# Patient Record
(demographics unavailable — no encounter records)

---

## 2025-01-17 NOTE — PHYSICAL EXAM
[General Appearance - Alert] : alert [General Appearance - In No Acute Distress] : in no acute distress [Extraocular Movements] : extraocular movements were intact [Musculoskeletal - Swelling] : no joint swelling seen [Motor Tone] : muscle strength and tone were normal

## 2025-01-17 NOTE — DATA REVIEWED
[FreeTextEntry1] : 1/12/25  , sodium 130, CO2 17, anion gap 21  Cr. 0.47, eGFR 113, AST 20, ALT 16  Negative Lyme IgG/IgM, RMSF IgM, babesia PCR   X-ray of left wrist (2/11/23 at )  IMPRESSION:  Normal left wrist radiographs   X-ray of left hand (2/11/23 at )  IMPRESSION:  Normal left hand radiographs

## 2025-01-17 NOTE — HISTORY OF PRESENT ILLNESS
[FreeTextEntry1] : The patient is a 54-year-old female with a PMH of HTN, HLD, GERD, gastritis, osteoarthritis, and osteopenia who presented to the office for an initial visit. The patient reported suddenly developing pain in her right hand about 1 week ago.  She subsequently developed pain in her right knee and possibly her right ankle.  She believes she may have had some swelling in the right hand.  She also experienced achiness in her upper arms and left buttock.  She denied having similar symptoms in the past.  She reported experiencing pain in her neck and chills several days prior to the onset of her symptoms.  She denied having any upper respiratory symptoms.  She was evaluated at an urgent care facility and underwent lab test which were notable for an elevated CRP level. She decided to discontinue taking hormone replacement therapy (estradiol and progesterone) the day after her symptoms started out of concerns about possible side effects.  Her symptoms have gradually improved since then and she currently denied having any similar joint pain or swelling. She was seen by her primary care physician a few days ago and had lab tests drawn but the results are not yet available.  Otherwise, the patient denied having any persistent pain or swelling in her joints.  She was diagnosed with a bone spur in her left thumb area which is painful during movement of the thumb.  She denied noticing any skin rashes, dry eyes, dry mouth, fevers, oral ulcers, Raynaud's phenomenon, history of seizures or blood clots.   Past Surgical History: ankle fusion Family History: No known family history of autoimmune diseases. Social History: denied cigarette smoking

## 2025-01-17 NOTE — ASSESSMENT
[FreeTextEntry1] : A 54-year-old female with a PMH of HTN, HLD, GERD, gastritis, osteoarthritis, and osteopenia who presented to the office for an initial visit. The patient reported suddenly developing pain in her right hand about 1 week ago.  She subsequently developed pain in her right knee and possibly her right ankle.  She believes she may have had some swelling in the right hand.  She also experienced achiness in her upper arms and left buttock.  She denied having similar symptoms in the past.  She reported experiencing pain in her neck and chills several days prior to the onset of her symptoms.  She denied having any upper respiratory symptoms.  She was evaluated at an urgent care facility and underwent lab test which were notable for an elevated CRP level. She decided to discontinue taking hormone replacement therapy (estradiol and progesterone) the day after her symptoms started out of concerns about possible side effects.  Her symptoms have gradually improved since then and she currently denied having any similar joint pain or swelling. She was seen by her primary care physician a few days ago and had lab tests drawn but the results are not yet available.  Otherwise, the patient denied having any persistent pain or swelling in her joints.  She was diagnosed with a bone spur in her left thumb area which is painful during movement of the thumb.  She denied noticing any skin rashes, dry eyes, dry mouth, fevers, oral ulcers, Raynaud's phenomenon, history of seizures or blood clots.  The etiology of the patient's symptoms is unclear at this time.  The patient was explained that an elevated CRP level is a non-specific marker for inflammation. The transient nature of her symptoms is suspicious for an infectious etiology.  Another possibility is her use of hormone replacement therapy which has been associated with musculoskeletal symptoms as a side effect.  The acuity of her symptoms is not typical of most connective tissue diseases, but the differential diagnosis includes polymyalgia rheumatica considering her elevated CRP level.  Plan: Obtain labs as ordered below; the patient agreed to complete them in a few weeks to allow her time to fully recover from her recent episode The patient was advised to monitor for the development of signs or symptoms of a connective tissue disease She was advised to inform us if she develops any of her initial symptoms again Follow-up with primary care  Return to office TBD pending repeat labs.

## 2025-03-24 NOTE — PLAN
[FreeTextEntry1] : restarting hrt, dwp t rba incl inc risk thromboembolism and mitigation by td est.  spent 35 min  min

## 2025-03-24 NOTE — HISTORY OF PRESENT ILLNESS
[FreeTextEntry1] : 55 yo pt here for hrt fu had severe arthritis attack , saw rheum, was assos w fifth disease never had it before.  son is 21,  works in schools felt much better on hrt, stopped when she had severe arthritis.  stayed  off hrt since then was sleeping  better, hot flashes resolved.  had a few days of spotting in january.

## 2025-06-02 NOTE — HISTORY OF PRESENT ILLNESS
[FreeTextEntry1] : 53 yo pt here for annual exam on e2 patches and progesterone at night brother  of duodenal cancer last august.  father dxd w lung ca ( was smoker as teen)  co vaginal dryness and pain w sex, interested in trying vag estrogen

## 2025-06-02 NOTE — PLAN
[FreeTextEntry1] : rba topical e2 dw pt for avv.  torri for osteopenia cousin had br ca at 40, other side of that cousins family has br ca hx.

## 2025-07-10 NOTE — PLAN
[FreeTextEntry1] : We discussed the new study results regarding the increased risk of cancer in patients under 55 or combined replacement, and the decreased risk of breast cancer for people that run estrogen alone.  However the patient has a family history of endometrial cancer in her mother and grandmother and is concerned about hormone replacement having some impact on her risks and has decided to stop using hormone replacement.  I discussed with her the possibility of progesterone containing IUD which would likely have less systemic exposure than oral progesterone, which she has been found to be protective against endometrial cancer, however she is worried about the possible length of combined hormone use and increased breast cancer risk.  We also discussed that if her symptoms become more severe and she wants to go back on hormone replacement we could consider lower doses.  We spent 30 minutes in this consultation.

## 2025-07-10 NOTE — HISTORY OF PRESENT ILLNESS
[FreeTextEntry1] : in june had right nipple erection,  then had carmping and vb x 5 days, felt like a period.   had neg mammogram.  mother and mgm had uterine ca.  new study showed incr risk br ca in pts on comb hrt, lower risk of br ca w e2 alone. has been on .375 est  , no sx.  wants to stop hrt, too worried w cancer risk .